# Patient Record
(demographics unavailable — no encounter records)

---

## 2024-10-29 NOTE — ASSESSMENT
[FreeTextEntry1] : Diabetes-AIC -.POC AIC  1/19/24 - 8.7 --> 4/23/24 POC -7.7 --> 8 7/2024 --> pOC aic - 10/29/24 7.8 improved    - has not taken ozempic for last 4 month - due to change in insurance 700 $ co pay  -10/29/24 restart ozempic 0.5 q weekly rtc 1/2025  -sugar readings reviewed post meals < 210 fasting < 130 - - none in 200s - no hypoglycemic results saw ophtho 9/5/24 3/13/23 --Ozempic 0.25 q weekly change 4/17/23 to 0.5 weekly-6/30/23 increase to 1 mg q weekly rx renewed - on glipizide xl 10 er 2 tab daily -lantus 15 am 15 pm , Trajenta not covered was changed to Januvia 50 qd ( off janumet 1000/50 po daily 12/2020): Steglatro 5 mg  instead of  Jardiance is co pay high - - log readings - educated pt risk of uncontrolled DM including CAD, retinopathy , nephropathy leading to Renal failure , dialysis , neuropathy , etc , high carbohydrate diet identified, pt agreed to watch his diet and eliminate rice and white bread, log readings bring to visit , referral to see dm wellness and endo given , compliance with diet and medications educated , continue Janumet as before ,refills given Monitor Accu-Cheks daily,Monitor for signs of hypoglycemia. Continue 1800 kcal ADA diet, avoid rice, pasta, sugar, sweet, soda, juices, exercise daily for 30 minutes. pneumonia vaccine 2014 -ophtho saw-2/2024 has appt 9/24   ch eczema - rx clobethazone renewed.  CKD - CR better 1.6 7/2023 -->1.41 1/24 --> 1.43 7/24 better  -on SGLT2 /DDP and GLP!  - nephro consult reviewed 8/14/24 -renal US 5/2019 rt renal cyst - needs better Glucose and BP control - stop janumet 12/2020 as cr > 1.7  HTN--128/60 home readings better -home readings 118-122-128/70-80 adviced to check 6 pm readings. --no cp sob palpitation or dizzy spells , + 2 ankle leg swelling - educated low salt diet , avoid canned , processed and fast food ,chips , bagged items , start exercise daily 30- 40 minutes , loose weight --changed to ramipril 5 2 tab am 1 tab pm and, change nifedipine ER 30 qd 9/24/21 - ( pt claims he gets swelling and heavy feeling in legs after taking nifedipine in afternoon and he tried to take it at bedtime and it bothered him ) cont hCTZ 25 - check BP next visit  GERD- stable , status post EGD, stable , avoid acidic food, avoid coffee, tea, avoid reclining up to 3 hours after meal, colonoscope done 8/22/14 was nl -[facility] - colonoscope 7/2022 get records - Dr Santos ochoa Gi - [phone]- records reviewed 5 polyps removed due 3 yrs  Hyperlipidemia- ASCVD 32.7 % change to Crestor 20 qhs 5/10/22 LDL 84 6/2023  stable on statin , continue current medications, Low-fat diet, avoid red meat, cheese, butter, peanuts and exercise daily for 40 minutes.  [facility] colonoscope 7/8/22 - 5 polyps removed due 3 yrs 2025  tdap-4/2019 Pneumovax 2014 -12/2019 Prevnar 13 7/2018 flu vaccine - 10/29/24  Shingrix 8/2018 , 12/14/18. covid first dose-3/6th second 4/3/21. -booster 1/26/22 .

## 2024-10-29 NOTE — HISTORY OF PRESENT ILLNESS
[de-identified] : 70 y.o male with PMH significant for T2DM, HTN, GERD, hypercholesterolemia, male erectile disorder, vit d deficiency, vitamin b12 deficiency, presenting for f/u on ch issues  retired 7/1/24  needs meds refilled Januvia ozempic and Insulin -copay high -insurance changed  has not been taking ozempic 4 months now   feels much better since starting Ozempic - leg pain / numbness etc resolved - has not take medication ozempic 1 month -pharmacy back ordered   DM AIC - started Ozempic q weekly 0.25 since- 4/2023 increased dose 0.5 weekly 6/2023--> on 2 mg q weekly    - denies nausea or stomach discomfort  -FBS- -112 -post prandial - < 132 -160-165 last 5 weeks > 200  - Semglee 15  am and 10 pm  -stopped Janumet 50/1000 qd 12/2020 due to Cr 1.8 - started on Januvia 50 qd and Jardiance 25 mg  -on crestor 20 qd  - Checks feet every morning, has not noticed any changes. Reports being very busy with tax season.  - saw opthalmology: No diabetic retinopathy detected-4/14/2021 will be getting Lazer sx rt eye 7/20/21 , left 7/27/21 saw ophtho 6/2021 - then 9/8/2021 - saw ophtho 5/2022   Trying to minimize carbs, however still eating rice twice/week. All other days vegetables.    CKD - followed by nephro  - - he gave new rx for amlodipine / valsartan and HCTZ combo - but the pharmacy did not carry it explanation was - they stopped making it - still on old medications  -BP now better , nifedipine 30 daily 12/2020  - home readings- 130-135- 132/ 72-78 30% time -- 70 % 142/80- 146/84  -needs better BP , Sugar And chol control   Hypertension -  - did stress test 9/2018 reviewed with pt neg ECHO nl  -on ramipril and HCTZ , nifedipine 30 qd  --compliant with medications and diet  HTn - better since increasing Nifedipaine to 60-- 120- 130 / 70 at home does at 9 am   GERD- much better s/p egd 2014, no discomfort , doing dietary modifications. saw GI did EGD results good, did colonoscope 2014 wnl   Hyperlipidemia- watching diet, low in fat on simvastatin. Denies any muscle pain  vitamin B 12 deficiency - taking Po supplements ,

## 2024-10-29 NOTE — ASSESSMENT
[FreeTextEntry1] : Diabetes-AIC -.POC AIC  1/19/24 - 8.7 --> 4/23/24 POC -7.7 --> 8 7/2024 --> pOC aic - 10/29/24 7.8 improved    - has not taken ozempic for last 4 month - due to change in insurance 700 $ co pay  -10/29/24 restart ozempic 0.5 q weekly rtc 1/2025  -sugar readings reviewed post meals < 210 fasting < 130 - - none in 200s - no hypoglycemic results saw ophtho 9/5/24 3/13/23 --Ozempic 0.25 q weekly change 4/17/23 to 0.5 weekly-6/30/23 increase to 1 mg q weekly rx renewed - on glipizide xl 10 er 2 tab daily -lantus 15 am 15 pm , Trajenta not covered was changed to Januvia 50 qd ( off janumet 1000/50 po daily 12/2020): Steglatro 5 mg  instead of  Jardiance is co pay high - - log readings - educated pt risk of uncontrolled DM including CAD, retinopathy , nephropathy leading to Renal failure , dialysis , neuropathy , etc , high carbohydrate diet identified, pt agreed to watch his diet and eliminate rice and white bread, log readings bring to visit , referral to see dm wellness and endo given , compliance with diet and medications educated , continue Janumet as before ,refills given Monitor Accu-Cheks daily,Monitor for signs of hypoglycemia. Continue 1800 kcal ADA diet, avoid rice, pasta, sugar, sweet, soda, juices, exercise daily for 30 minutes. pneumonia vaccine 2014 -ophtho saw-2/2024 has appt 9/24   ch eczema - rx clobethazone renewed.  CKD - CR better 1.6 7/2023 -->1.41 1/24 --> 1.43 7/24 better  -on SGLT2 /DDP and GLP!  - nephro consult reviewed 8/14/24 -renal US 5/2019 rt renal cyst - needs better Glucose and BP control - stop janumet 12/2020 as cr > 1.7  HTN--128/60 home readings better -home readings 118-122-128/70-80 adviced to check 6 pm readings. --no cp sob palpitation or dizzy spells , + 2 ankle leg swelling - educated low salt diet , avoid canned , processed and fast food ,chips , bagged items , start exercise daily 30- 40 minutes , loose weight --changed to ramipril 5 2 tab am 1 tab pm and, change nifedipine ER 30 qd 9/24/21 - ( pt claims he gets swelling and heavy feeling in legs after taking nifedipine in afternoon and he tried to take it at bedtime and it bothered him ) cont hCTZ 25 - check BP next visit  GERD- stable , status post EGD, stable , avoid acidic food, avoid coffee, tea, avoid reclining up to 3 hours after meal, colonoscope done 8/22/14 was nl -[address] [facility] - colonoscope 7/2022 get records - Dr Santos ochoa Gi - [phone]- records reviewed 5 polyps removed due 3 yrs  Hyperlipidemia- ASCVD 32.7 % change to Crestor 20 qhs 5/10/22 LDL 84 6/2023  stable on statin , continue current medications, Low-fat diet, avoid red meat, cheese, butter, peanuts and exercise daily for 40 minutes.  [facility] colonoscope 7/8/22 - 5 polyps removed due 3 yrs 2025  tdap-4/2019 Pneumovax 2014 -12/2019 Prevnar 13 7/2018 flu vaccine - 10/29/24  Shingrix 8/2018 , 12/14/18. covid first dose-3/6th second 4/3/21. -booster 1/26/22 .

## 2024-10-29 NOTE — HISTORY OF PRESENT ILLNESS
[de-identified] : 70 y.o male with PMH significant for T2DM, HTN, GERD, hypercholesterolemia, male erectile disorder, vit d deficiency, vitamin b12 deficiency, presenting for f/u on ch issues  retired 7/1/24  needs meds refilled Januvia ozempic and Insulin -copay high -insurance changed  has not been taking ozempic 4 months now   feels much better since starting Ozempic - leg pain / numbness etc resolved - has not take medication ozempic 1 month -pharmacy back ordered   DM AIC - started Ozempic q weekly 0.25 since- 4/2023 increased dose 0.5 weekly 6/2023--> on 2 mg q weekly    - denies nausea or stomach discomfort  -FBS- -112 -post prandial - < 132 -160-165 last 5 weeks > 200  - Semglee 15  am and 10 pm  -stopped Janumet 50/1000 qd 12/2020 due to Cr 1.8 - started on Januvia 50 qd and Jardiance 25 mg  -on crestor 20 qd  - Checks feet every morning, has not noticed any changes. Reports being very busy with tax season.  - saw opthalmology: No diabetic retinopathy detected-4/14/2021 will be getting Lazer sx rt eye 7/20/21 , left 7/27/21 saw ophtho 6/2021 - then 9/8/2021 - saw ophtho 5/2022   Trying to minimize carbs, however still eating rice twice/week. All other days vegetables.    CKD - followed by nephro  - - he gave new rx for amlodipine / valsartan and HCTZ combo - but the pharmacy did not carry it explanation was - they stopped making it - still on old medications  -BP now better , nifedipine 30 daily 12/2020  - home readings- 130-135- 132/ 72-78 30% time -- 70 % 142/80- 146/84  -needs better BP , Sugar And chol control   Hypertension -  - did stress test 9/2018 reviewed with pt neg ECHO nl  -on ramipril and HCTZ , nifedipine 30 qd  --compliant with medications and diet  HTn - better since increasing Nifedipaine to 60-- 120- 130 / 70 at home does at 9 am   GERD- much better s/p egd 2014, no discomfort , doing dietary modifications. saw GI did EGD results good, did colonoscope 2014 wnl   Hyperlipidemia- watching diet, low in fat on simvastatin. Denies any muscle pain  vitamin B 12 deficiency - taking Po supplements ,

## 2025-01-28 NOTE — ASSESSMENT
[FreeTextEntry1] : Diabetes-AIC -.POC AIC  1/19/24 - 8.7 --> 4/23/24 POC -7.7 --> 8 7/2024 --> pOC aic - 10/29/24 7.8--> 7.4 1/28/25 improved    - has not taken Ozempic for last 2 weeks ran out - change to 1 q weekly 1/28/25  -sugar readings reviewed post meals < 210 fasting < 130 - - none in 200s - no hypoglycemic results saw ophtho 9/5/24 3/13/23 --Ozempic 0.25 q weekly change 4/17/23 to 0.5 weekly-6/30/23 increase to 1 mg q weekly rx renewed - on glipizide xl 10 er 2 tab daily -lantus 15 am 15 pm , Trajenta not covered was changed to Januvia 50 qd ( off janumet 1000/50 po daily 12/2020): Steglatro 5 mg  instead of  Jardiance is co pay high - - log readings - educated pt risk of uncontrolled DM including CAD, retinopathy , nephropathy leading to Renal failure , dialysis , neuropathy , etc , high carbohydrate diet identified, pt agreed to watch his diet and eliminate rice and white bread, log readings bring to visit , referral to see dm wellness and endo given , compliance with diet and medications educated , continue Janumet as before ,refills given Monitor Accu-Cheks daily,Monitor for signs of hypoglycemia. Continue 1800 kcal ADA diet, avoid rice, pasta, sugar, sweet, soda, juices, exercise daily for 30 minutes. -prev 20 1/28/25   ch eczema - rx clobethazone renewed.  CKD - CR better 1.6 7/2023 -->1.41 1/24 --> 1.43 7/24 better  -on SGLT2 /DDP and GLP!  - nephro consult reviewed 8/14/24 -renal US 5/2019 rt renal cyst - needs better Glucose and BP control - stop janumet 12/2020 as cr > 1.7  HTN--home readings better -home readings 118-122-128/70-80 advice to check 6 pm readings. --no cp sob palpitation or dizzy spells , + 2 ankle leg swelling - educated low salt diet , avoid canned , processed and fast food ,chips , bagged items , start exercise daily 30- 40 minutes , loose weight --changed to ramipril 5 2 tab am 1 tab pm and, change nifedipine ER 30 qd 9/24/21 - ( pt claims he gets swelling and heavy feeling in legs after taking nifedipine in afternoon and he tried to take it at bedtime and it bothered him ) cont hCTZ 25 - check BP next visit  GERD- stable , status post EGD, stable , avoid acidic food, avoid coffee, tea, avoid reclining up to 3 hours after meal, [facility]22[facility]14 [facility] 7/2022 get records - Dr Santos ochoa Gi - [phone]- records reviewed 5 polyps removed due 3 yrs  Hyperlipidemia- ASCVD 32.7 % change to Crestor 20 qhs 5/10/22 LDL 84 6/2023  stable on statin , continue current medications, Low-fat diet, avoid red meat, cheese, butter, peanuts and exercise daily for 40 minutes.  [facility] colonoscope 7/8/22 - 5 polyps removed due 3 yrs 2025  tdap-4/2019 Pneumovax 2014 -12/2019 Prevnar 13 7/2018 prevnar 20 - 1/28/25  flu vaccine - 10/29/24  Shingrix 8/2018 , 12/14/18. covid first dose-3/6th second 4/3/21. -booster 1/26/22 .RSV advised

## 2025-05-06 NOTE — HISTORY OF PRESENT ILLNESS
[de-identified] : 71 y.o male with PMH significant for T2DM, HTN, GERD, hypercholesterolemia, male erectile disorder, vit d deficiency, vitamin b12 deficiency, presenting for f/u on ch issues  retired 7/1/24  needs meds refilled Januvia ozempic and Insulin -copay high -insurance changed  Fasting 75-80--  Post prandial- 185-92 none in 200   DM AIC - started Ozempic q weekly 0.25 since- 4/2023 increased dose 0.5 weekly 6/2023--> on 2 mg q weekly    - denies nausea or stomach discomfort  -FBS- -112 -post prandial - < 132 -160-165 last 5 weeks > 200  - Lantus 15  am and 20 pm  -stopped Janumet 50/1000 qd 12/2020 due to Cr 1.8 - started on Januvia 50 qd and Jardiance 25 mg  -on crestor 20 qd  - Checks feet every morning, has not noticed any changes. Reports being very busy with tax season.  - saw opthalmology: No diabetic retinopathy detected-4/14/2021 will be getting Lazer sx rt eye 7/20/21 , left 7/27/21 saw ophtho 6/2021 - then 9/8/2021 - saw ophtho 5/2022   Trying to minimize carbs, however still eating rice twice/week. All other days vegetables.    CKD - followed by nephro  - - he gave new rx for amlodipine / valsartan and HCTZ combo - but the pharmacy did not carry it explanation was - they stopped making it - still on old medications  -BP now better , nifedipine 30 daily 12/2020  - home readings- 130-135- 132/ 72-78 30% time -- 70 % 142/80- 146/84  -needs better BP , Sugar And chol control   Hypertension -  - did stress test 9/2018 reviewed with pt neg ECHO nl  -on ramipril and HCTZ , nifedipine 30 qd  --compliant with medications and diet  HTn - better since increasing Nifedipaine to 60-- 120- 130 / 70 at home does at 9 am   GERD- much better s/p egd 2014, no discomfort , doing dietary modifications. saw GI did EGD results good, did colonoscope 2014 wnl   Hyperlipidemia- watching diet, low in fat on simvastatin. Denies any muscle pain  vitamin B 12 deficiency - taking Po supplements ,

## 2025-05-06 NOTE — ASSESSMENT
[FreeTextEntry1] : Diabetes-AIC -.POC AIC  1/19/24 - 8.7 --> 4/23/24 POC -7.7 --> 8 7/2024 --> pOC aic - 10/29/24 7.8--> 7.4 1/28/25 improved  -- change to 1 q weekly 1/28/25 change to 2 mg q weekly 5/6/25  -sugar readings reviewed post meals < 210 fasting < 130 - - none in 200s - no hypoglycemic results saw ophtho 9/5/24 - on glipizide xl 10 er 2 tab daily -lantus 15 am 20 , alogliptin 25 qd  (Trajenta not covered was changed to Januvia 50 qd ( off janumet 1000/50 po daily 12/2020): Steglatro 5 mg  instead of  Jardiance is co pay high -) - log readings - educated pt risk of uncontrolled DM including CAD, retinopathy , nephropathy leading to Renal failure , dialysis , neuropathy , etc , high carbohydrate diet identified, pt agreed to watch his diet and eliminate rice and white bread, log readings bring to visit , referral to see dm wellness and endo given , compliance with diet and medications educated , continue Janumet as before ,refills given Monitor Accu-Cheks daily,Monitor for signs of hypoglycemia. Continue 1800 kcal ADA diet, avoid rice, pasta, sugar, sweet, soda, juices, exercise daily for 30 minutes. -prev 20 1/28/25   ch eczema - rx clobethazone renewed.  CKD - CR better 1.6 7/2023 -->1.41 1/24 --> 1.5 1/25  better  -on SGLT2 /DDP and GLP!  - nephro consult reviewed 8/14/24 -renal US 5/2019 rt renal cyst - needs better Glucose and BP control - stop janumet 12/2020 as cr > 1.7  HTN--high today 149/68 -- 139/70 after resting home readings better -home readings 118-122-128/70-80 advice to check 6 pm readings. --no cp sob palpitation or dizzy spells , + 2 ankle leg swelling - educated low salt diet , avoid canned , processed and fast food ,chips , bagged items , start exercise daily 30- 40 minutes , loose weight --changed to ramipril 5 2 tab am 1 tab pm and, change nifedipine ER 30 qd 9/24/21 - ( pt claims he gets swelling and heavy feeling in legs after taking nifedipine in afternoon and he tried to take it at bedtime and it bothered him ) cont hCTZ 25 - check BP next visit  GERD- stable , status post EGD, stable , avoid acidic food, avoid coffee, tea, avoid reclining up to 3 hours after meal, colonoscope done 8/22/14 was nl -[address] [facility] - colonoscope 7/2022 get records - Dr Santos ochoa Gi - [phone]- records reviewed 5 polyps removed due 3 yrs  Hyperlipidemia- ASCVD 32.7 % change to Crestor 20 qhs  1/25 stable on statin , continue current medications, Low-fat diet, avoid red meat, cheese, butter, peanuts and exercise daily for 40 minutes.  [facility] colonoscope 7/8/22 - 5 polyps removed due 3 yrs 2025  tdap-4/2019 Pneumovax 2014 -12/2019 Prevnar 13 7/2018 prevnar 20 - 1/28/25  Skin derm- 3/5/25  flu vaccine - 10/29/24  Shingrix 8/2018 , 12/14/18. covid first dose-3/6th second 4/3/21. -booster 1/26/22 .RSV advised